# Patient Record
Sex: MALE | Race: BLACK OR AFRICAN AMERICAN | NOT HISPANIC OR LATINO | URBAN - METROPOLITAN AREA
[De-identification: names, ages, dates, MRNs, and addresses within clinical notes are randomized per-mention and may not be internally consistent; named-entity substitution may affect disease eponyms.]

---

## 2017-09-09 ENCOUNTER — INPATIENT (INPATIENT)
Facility: HOSPITAL | Age: 52
LOS: 1 days | Discharge: HOME | End: 2017-09-11
Attending: INTERNAL MEDICINE | Admitting: INTERNAL MEDICINE
Payer: COMMERCIAL

## 2017-09-09 VITALS
OXYGEN SATURATION: 100 % | DIASTOLIC BLOOD PRESSURE: 116 MMHG | HEART RATE: 64 BPM | RESPIRATION RATE: 18 BRPM | TEMPERATURE: 99 F | SYSTOLIC BLOOD PRESSURE: 172 MMHG

## 2017-09-09 DIAGNOSIS — R74.8 ABNORMAL LEVELS OF OTHER SERUM ENZYMES: ICD-10-CM

## 2017-09-09 DIAGNOSIS — I16.0 HYPERTENSIVE URGENCY: ICD-10-CM

## 2017-09-09 DIAGNOSIS — Z29.9 ENCOUNTER FOR PROPHYLACTIC MEASURES, UNSPECIFIED: ICD-10-CM

## 2017-09-09 DIAGNOSIS — N18.3 CHRONIC KIDNEY DISEASE, STAGE 3 (MODERATE): ICD-10-CM

## 2017-09-09 DIAGNOSIS — Z98.52 VASECTOMY STATUS: Chronic | ICD-10-CM

## 2017-09-09 LAB
ALBUMIN SERPL ELPH-MCNC: 3.8 G/DL — SIGNIFICANT CHANGE UP (ref 3.3–5)
ALP SERPL-CCNC: 72 U/L — SIGNIFICANT CHANGE UP (ref 40–120)
ALT FLD-CCNC: 28 U/L — SIGNIFICANT CHANGE UP (ref 12–78)
ANION GAP SERPL CALC-SCNC: 11 MMOL/L — SIGNIFICANT CHANGE UP (ref 5–17)
ANION GAP SERPL CALC-SCNC: 6 MMOL/L — SIGNIFICANT CHANGE UP (ref 5–17)
APPEARANCE UR: CLEAR — SIGNIFICANT CHANGE UP
APTT BLD: 26.7 SEC — LOW (ref 27.5–37.4)
AST SERPL-CCNC: 19 U/L — SIGNIFICANT CHANGE UP (ref 15–37)
BACTERIA # UR AUTO: ABNORMAL
BASOPHILS # BLD AUTO: 0.1 K/UL — SIGNIFICANT CHANGE UP (ref 0–0.2)
BASOPHILS NFR BLD AUTO: 0.8 % — SIGNIFICANT CHANGE UP (ref 0–2)
BILIRUB SERPL-MCNC: 0.7 MG/DL — SIGNIFICANT CHANGE UP (ref 0.2–1.2)
BILIRUB UR-MCNC: NEGATIVE — SIGNIFICANT CHANGE UP
BUN SERPL-MCNC: 32 MG/DL — HIGH (ref 7–23)
BUN SERPL-MCNC: 34 MG/DL — HIGH (ref 7–23)
CALCIUM SERPL-MCNC: 8.7 MG/DL — SIGNIFICANT CHANGE UP (ref 8.5–10.1)
CALCIUM SERPL-MCNC: 8.9 MG/DL — SIGNIFICANT CHANGE UP (ref 8.5–10.1)
CHLORIDE SERPL-SCNC: 106 MMOL/L — SIGNIFICANT CHANGE UP (ref 96–108)
CHLORIDE SERPL-SCNC: 110 MMOL/L — HIGH (ref 96–108)
CK MB BLD-MCNC: 0.8 % — SIGNIFICANT CHANGE UP (ref 0–3.5)
CK MB BLD-MCNC: 1 % — SIGNIFICANT CHANGE UP (ref 0–3.5)
CK MB CFR SERPL CALC: 2.2 NG/ML — SIGNIFICANT CHANGE UP (ref 0.5–3.6)
CK MB CFR SERPL CALC: 2.3 NG/ML — SIGNIFICANT CHANGE UP (ref 0.5–3.6)
CK SERPL-CCNC: 227 U/L — SIGNIFICANT CHANGE UP (ref 26–308)
CK SERPL-CCNC: 294 U/L — SIGNIFICANT CHANGE UP (ref 26–308)
CO2 SERPL-SCNC: 26 MMOL/L — SIGNIFICANT CHANGE UP (ref 22–31)
CO2 SERPL-SCNC: 28 MMOL/L — SIGNIFICANT CHANGE UP (ref 22–31)
COLOR SPEC: YELLOW — SIGNIFICANT CHANGE UP
CREAT SERPL-MCNC: 1.92 MG/DL — HIGH (ref 0.5–1.3)
CREAT SERPL-MCNC: 2.21 MG/DL — HIGH (ref 0.5–1.3)
DIFF PNL FLD: NEGATIVE — SIGNIFICANT CHANGE UP
EOSINOPHIL # BLD AUTO: 0.1 K/UL — SIGNIFICANT CHANGE UP (ref 0–0.5)
EOSINOPHIL NFR BLD AUTO: 1.4 % — SIGNIFICANT CHANGE UP (ref 0–6)
EPI CELLS # UR: SIGNIFICANT CHANGE UP
GLUCOSE SERPL-MCNC: 148 MG/DL — HIGH (ref 70–99)
GLUCOSE SERPL-MCNC: 87 MG/DL — SIGNIFICANT CHANGE UP (ref 70–99)
GLUCOSE UR QL: NEGATIVE MG/DL — SIGNIFICANT CHANGE UP
HCT VFR BLD CALC: 43.4 % — SIGNIFICANT CHANGE UP (ref 39–50)
HGB BLD-MCNC: 13.5 G/DL — SIGNIFICANT CHANGE UP (ref 13–17)
HYALINE CASTS # UR AUTO: ABNORMAL /LPF
INR BLD: 1.04 RATIO — SIGNIFICANT CHANGE UP (ref 0.88–1.16)
KETONES UR-MCNC: NEGATIVE — SIGNIFICANT CHANGE UP
LEUKOCYTE ESTERASE UR-ACNC: NEGATIVE — SIGNIFICANT CHANGE UP
LYMPHOCYTES # BLD AUTO: 2.1 K/UL — SIGNIFICANT CHANGE UP (ref 1–3.3)
LYMPHOCYTES # BLD AUTO: 21.2 % — SIGNIFICANT CHANGE UP (ref 13–44)
MCHC RBC-ENTMCNC: 24.3 PG — LOW (ref 27–34)
MCHC RBC-ENTMCNC: 31.1 GM/DL — LOW (ref 32–36)
MCV RBC AUTO: 78.1 FL — LOW (ref 80–100)
MONOCYTES # BLD AUTO: 0.5 K/UL — SIGNIFICANT CHANGE UP (ref 0–0.9)
MONOCYTES NFR BLD AUTO: 5 % — SIGNIFICANT CHANGE UP (ref 2–14)
NEUTROPHILS # BLD AUTO: 7.1 K/UL — SIGNIFICANT CHANGE UP (ref 1.8–7.4)
NEUTROPHILS NFR BLD AUTO: 71.6 % — SIGNIFICANT CHANGE UP (ref 43–77)
NITRITE UR-MCNC: NEGATIVE — SIGNIFICANT CHANGE UP
NT-PROBNP SERPL-SCNC: 1135 PG/ML — HIGH (ref 0–125)
PH UR: 5 — SIGNIFICANT CHANGE UP (ref 5–8)
PLATELET # BLD AUTO: 261 K/UL — SIGNIFICANT CHANGE UP (ref 150–400)
POTASSIUM SERPL-MCNC: 2.7 MMOL/L — CRITICAL LOW (ref 3.5–5.3)
POTASSIUM SERPL-MCNC: 3.3 MMOL/L — LOW (ref 3.5–5.3)
POTASSIUM SERPL-SCNC: 2.7 MMOL/L — CRITICAL LOW (ref 3.5–5.3)
POTASSIUM SERPL-SCNC: 3.3 MMOL/L — LOW (ref 3.5–5.3)
PROT SERPL-MCNC: 8.1 GM/DL — SIGNIFICANT CHANGE UP (ref 6–8.3)
PROT UR-MCNC: 100 MG/DL
PROTHROM AB SERPL-ACNC: 11.3 SEC — SIGNIFICANT CHANGE UP (ref 9.8–12.7)
RBC # BLD: 5.55 M/UL — SIGNIFICANT CHANGE UP (ref 4.2–5.8)
RBC # FLD: 13 % — SIGNIFICANT CHANGE UP (ref 11–15)
RBC CASTS # UR COMP ASSIST: SIGNIFICANT CHANGE UP /HPF (ref 0–4)
SODIUM SERPL-SCNC: 143 MMOL/L — SIGNIFICANT CHANGE UP (ref 135–145)
SODIUM SERPL-SCNC: 144 MMOL/L — SIGNIFICANT CHANGE UP (ref 135–145)
SP GR SPEC: 1.01 — SIGNIFICANT CHANGE UP (ref 1.01–1.02)
TROPONIN I SERPL-MCNC: 0.12 NG/ML — HIGH (ref 0.01–0.04)
TROPONIN I SERPL-MCNC: 0.33 NG/ML — HIGH (ref 0.01–0.04)
TSH SERPL-MCNC: 0.79 UIU/ML — SIGNIFICANT CHANGE UP (ref 0.36–3.74)
UROBILINOGEN FLD QL: NEGATIVE MG/DL — SIGNIFICANT CHANGE UP
WBC # BLD: 9.9 K/UL — SIGNIFICANT CHANGE UP (ref 3.8–10.5)
WBC # FLD AUTO: 9.9 K/UL — SIGNIFICANT CHANGE UP (ref 3.8–10.5)
WBC UR QL: SIGNIFICANT CHANGE UP

## 2017-09-09 PROCEDURE — 99223 1ST HOSP IP/OBS HIGH 75: CPT

## 2017-09-09 PROCEDURE — 99284 EMERGENCY DEPT VISIT MOD MDM: CPT | Mod: 25

## 2017-09-09 PROCEDURE — 93010 ELECTROCARDIOGRAM REPORT: CPT

## 2017-09-09 PROCEDURE — 93306 TTE W/DOPPLER COMPLETE: CPT | Mod: 26

## 2017-09-09 PROCEDURE — 99223 1ST HOSP IP/OBS HIGH 75: CPT | Mod: AI

## 2017-09-09 RX ORDER — AMLODIPINE BESYLATE 2.5 MG/1
10 TABLET ORAL DAILY
Qty: 0 | Refills: 0 | Status: DISCONTINUED | OUTPATIENT
Start: 2017-09-09 | End: 2017-09-11

## 2017-09-09 RX ORDER — LABETALOL HCL 100 MG
10 TABLET ORAL ONCE
Qty: 0 | Refills: 0 | Status: COMPLETED | OUTPATIENT
Start: 2017-09-09 | End: 2017-09-09

## 2017-09-09 RX ORDER — TAMSULOSIN HYDROCHLORIDE 0.4 MG/1
0.4 CAPSULE ORAL AT BEDTIME
Qty: 0 | Refills: 0 | Status: DISCONTINUED | OUTPATIENT
Start: 2017-09-09 | End: 2017-09-10

## 2017-09-09 RX ORDER — ASPIRIN/CALCIUM CARB/MAGNESIUM 324 MG
81 TABLET ORAL DAILY
Qty: 0 | Refills: 0 | Status: DISCONTINUED | OUTPATIENT
Start: 2017-09-09 | End: 2017-09-11

## 2017-09-09 RX ORDER — LABETALOL HCL 100 MG
5 TABLET ORAL ONCE
Qty: 0 | Refills: 0 | Status: COMPLETED | OUTPATIENT
Start: 2017-09-09 | End: 2017-09-09

## 2017-09-09 RX ORDER — ASPIRIN/CALCIUM CARB/MAGNESIUM 324 MG
325 TABLET ORAL ONCE
Qty: 0 | Refills: 0 | Status: COMPLETED | OUTPATIENT
Start: 2017-09-09 | End: 2017-09-09

## 2017-09-09 RX ORDER — HYDRALAZINE HCL 50 MG
10 TABLET ORAL ONCE
Qty: 0 | Refills: 0 | Status: DISCONTINUED | OUTPATIENT
Start: 2017-09-09 | End: 2017-09-09

## 2017-09-09 RX ORDER — HYDRALAZINE HCL 50 MG
10 TABLET ORAL ONCE
Qty: 0 | Refills: 0 | Status: COMPLETED | OUTPATIENT
Start: 2017-09-09 | End: 2017-09-09

## 2017-09-09 RX ORDER — POTASSIUM CHLORIDE 20 MEQ
40 PACKET (EA) ORAL ONCE
Qty: 0 | Refills: 0 | Status: COMPLETED | OUTPATIENT
Start: 2017-09-09 | End: 2017-09-09

## 2017-09-09 RX ORDER — LOSARTAN POTASSIUM 100 MG/1
100 TABLET, FILM COATED ORAL DAILY
Qty: 0 | Refills: 0 | Status: DISCONTINUED | OUTPATIENT
Start: 2017-09-09 | End: 2017-09-11

## 2017-09-09 RX ORDER — HEPARIN SODIUM 5000 [USP'U]/ML
5000 INJECTION INTRAVENOUS; SUBCUTANEOUS EVERY 12 HOURS
Qty: 0 | Refills: 0 | Status: DISCONTINUED | OUTPATIENT
Start: 2017-09-09 | End: 2017-09-11

## 2017-09-09 RX ADMIN — Medication 325 MILLIGRAM(S): at 06:01

## 2017-09-09 RX ADMIN — LOSARTAN POTASSIUM 100 MILLIGRAM(S): 100 TABLET, FILM COATED ORAL at 08:10

## 2017-09-09 RX ADMIN — Medication 10 MILLIGRAM(S): at 21:38

## 2017-09-09 RX ADMIN — Medication 5 MILLIGRAM(S): at 05:40

## 2017-09-09 RX ADMIN — AMLODIPINE BESYLATE 10 MILLIGRAM(S): 2.5 TABLET ORAL at 08:10

## 2017-09-09 RX ADMIN — Medication 10 MILLIGRAM(S): at 18:47

## 2017-09-09 RX ADMIN — TAMSULOSIN HYDROCHLORIDE 0.4 MILLIGRAM(S): 0.4 CAPSULE ORAL at 21:39

## 2017-09-09 RX ADMIN — Medication 40 MILLIEQUIVALENT(S): at 05:26

## 2017-09-09 RX ADMIN — Medication 10 MILLIGRAM(S): at 06:55

## 2017-09-09 RX ADMIN — Medication 10 MILLIGRAM(S): at 08:00

## 2017-09-09 RX ADMIN — HEPARIN SODIUM 5000 UNIT(S): 5000 INJECTION INTRAVENOUS; SUBCUTANEOUS at 18:50

## 2017-09-09 RX ADMIN — Medication 81 MILLIGRAM(S): at 13:08

## 2017-09-09 NOTE — H&P ADULT - NSHPLABSRESULTS_GEN_ALL_CORE
LABS:                        13.5   9.9   )-----------( 261      ( 09 Sep 2017 03:05 )             43.4         143  |  106  |  34<H>  ----------------------------<  148<H>  2.7<LL>   |  26  |  2.21<H>    Ca    8.9      09 Sep 2017 03:05    TPro  8.1  /  Alb  3.8  /  TBili  0.7  /  DBili  x   /  AST  19  /  ALT  28  /  AlkPhos  72      PT/INR - ( 09 Sep 2017 03:05 )   PT: 11.3 sec;   INR: 1.04 ratio         PTT - ( 09 Sep 2017 03:05 )  PTT:26.7 sec  Urinalysis Basic - ( 09 Sep 2017 05:19 )    Color: Yellow / Appearance: Clear / S.015 / pH: x  Gluc: x / Ketone: Negative  / Bili: Negative / Urobili: Negative mg/dL   Blood: x / Protein: 100 mg/dL / Nitrite: Negative   Leuk Esterase: Negative / RBC: 0-2 /HPF / WBC 0-2   Sq Epi: x / Non Sq Epi: x / Bacteria: Few      CAPILLARY BLOOD GLUCOSE          RADIOLOGY & ADDITIONAL TESTS:    Imaging Personally Reviewed:  [ X] YES  [ ] NO

## 2017-09-09 NOTE — H&P ADULT - PROBLEM SELECTOR PLAN 1
admit to tele  pt doesn't prefer hydralazine as thinks affects sexually  increase losartan to 100  resume norvasc
hair removal not indicated

## 2017-09-09 NOTE — H&P ADULT - ASSESSMENT
50M with pmh CKD, HTN poor compliance , who is admitted with hypertensive urgency and elevated troponin

## 2017-09-09 NOTE — CONSULT NOTE ADULT - SUBJECTIVE AND OBJECTIVE BOX
Patient is a 52y old  Male who presents with a chief complaint of elevated BP  HPI:Patient ran out of medication 1 month ago, and stopped a second medication.        PAST MEDICAL & SURGICAL HISTORY:  Renal failure  Pneumonia  Hypertension  H/O vasectomy    FAMILY HISTORY:  Family history of congenital heart disease (Father, Mother)    No Known Allergies    MEDICATIONS  (STANDING):    MEDICATIONS  (PRN):    Vital Signs Last 24 Hrs  T(C): 37.3 (09 Sep 2017 12:06), Max: 37.3 (09 Sep 2017 12:06)  T(F): 99.2 (09 Sep 2017 12:06), Max: 99.2 (09 Sep 2017 12:06)  HR: 65 (09 Sep 2017 12:06) (65 - 65)  BP: 171/109 (09 Sep 2017 12:06) (171/109 - 171/109)  BP(mean): --  RR: 16 (09 Sep 2017 12:06) (16 - 16)  SpO2: 100% (09 Sep 2017 12:06) (100% - 100%)    CAPILLARY BLOOD GLUCOSE        PHYSICAL EXAM:      T(C): 37.3 (09 Sep 2017 12:06), Max: 37.3 (09 Sep 2017 12:06)  HR: 65 (09 Sep 2017 12:06) (65 - 65)  BP: 171/109 (09 Sep 2017 12:06) (171/109 - 171/109)  RR: 16 (09 Sep 2017 12:06) (16 - 16)  SpO2: 100% (09 Sep 2017 12:06) (100% - 100%)  Wt(kg): --  Respiratory: clear anteriorly, decreased BS at bases  Cardiovascular: S1 S2  Gastrointestinal: soft NT ND +BS  Extremities:   1 edema                  143  |  106  |  34<H>  ----------------------------<  148<H>  2.7<LL>   |  26  |  2.21<H>    Ca    8.9      09 Sep 2017 03:05    TPro  8.1  /  Alb  3.8  /  TBili  0.7  /  DBili  x   /  AST  19  /  ALT  28  /  AlkPhos  72                            13.5   9.9   )-----------( 261      ( 09 Sep 2017 03:05 )             43.4     Urinalysis Basic - ( 09 Sep 2017 05:19 )    Color: Yellow / Appearance: Clear / S.015 / pH: x  Gluc: x / Ketone: Negative  / Bili: Negative / Urobili: Negative mg/dL   Blood: x / Protein: 100 mg/dL / Nitrite: Negative   Leuk Esterase: Negative / RBC: 0-2 /HPF / WBC 0-2   Sq Epi: x / Non Sq Epi: x / Bacteria: Few            Assessment and Plan    CKD 3; HTN urgency; non adherence.  Medication reinstitution;  Will follow; avoid Hydralazine for now patient preference.

## 2017-09-09 NOTE — H&P ADULT - FAMILY HISTORY
Father  Still living? Unknown  Family history of congenital heart disease, Age at diagnosis: Age Unknown     Mother  Still living? Unknown  Family history of congenital heart disease, Age at diagnosis: Age Unknown

## 2017-09-09 NOTE — H&P ADULT - HISTORY OF PRESENT ILLNESS
Patient is a 51 y/o male with pmhx of  HTN, CKD  who presents withhe was in [facility] was checking his room mates BP and then decided to check his and it was 200/110 and comes in.  pt denies any HA, fever, chills, sob, palpitations, cp, n/v/d/c.  Pt denies being compliant w/meds, ran out of norvasc a month ago (didnt ask pmd for refill) hydralazine states affects sexually so doesnt take all the time, was just taking losartan.  In ed pt also found to have elevated troponin, low K.  pt w/good exercise tolerance.  on reviewing prior records pt had hep B sAg + in 2016, pt w/o fu on that

## 2017-09-09 NOTE — H&P ADULT - NSHPPHYSICALEXAM_GEN_ALL_CORE
Vital Signs Last 24 Hrs  T(C): 37.3 (09 Sep 2017 12:06), Max: 37.3 (09 Sep 2017 12:06)  T(F): 99.2 (09 Sep 2017 12:06), Max: 99.2 (09 Sep 2017 12:06)  HR: 65 (09 Sep 2017 12:06) (64 - 65)  BP: 171/109 (09 Sep 2017 12:06) (171/109 - 172/116)  BP(mean): --  RR: 16 (09 Sep 2017 12:06) (16 - 18)  SpO2: 100% (09 Sep 2017 12:06) (100% - 100%)    PHYSICAL EXAM:    GENERAL: NAD, well-groomed, well-developed  HEAD:  Atraumatic, Normocephalic  EYES: EOMI, PERRLA, conjunctiva and sclera clear  ENMT: No tonsillar erythema, exudates, or enlargement; Moist mucous membranes, No lesions  NECK: Supple, No JVD, Normal thyroid  NERVOUS SYSTEM:  Alert & Oriented X3, Good concentration; Motor Strength 5/5 B/L upper and lower extremities; DTRs 2+ intact and symmetric  CHEST/LUNG: Clear to percussion bilaterally; No rales, rhonchi, wheezing, or rubs  HEART: Regular rate and rhythm; No rubs, or gallops, +S1,S2  ABDOMEN: Soft, Nontender, Nondistended; Bowel sounds present  EXTREMITIES:  2+ Peripheral Pulses, No clubbing, cyanosis, 1+edema  LYMPH: No cervical adenopathy  RECTAL: deferred  BREAST: No palpatble masses, skin no lesions   : deferred  SKIN: No rashes or lesions

## 2017-09-09 NOTE — CHART NOTE - NSCHARTNOTEFT_GEN_A_CORE
patient was seen and examine at ECHO  No chest pain, SOB   feeing well  Chest clear   No edema LE   A/p   Labs reviewed    HTN urgency  Continue Norvasc and ARB  labs in AM

## 2017-09-09 NOTE — DOWNTIME INTERRUPTION NOTE - WHICH MANUAL FORMS INITIATED?
home med list  paper MAR- STAT Medication   physician order   ER Nursing care record  ER I&O/VS Care record  Admission demographics  ER MAR  ER Admission Physician Assessment  Progress Notes, Critical value notification, Physician Order Sheet- Admission, Consultation

## 2017-09-10 LAB
ANION GAP SERPL CALC-SCNC: 9 MMOL/L — SIGNIFICANT CHANGE UP (ref 5–17)
BUN SERPL-MCNC: 31 MG/DL — HIGH (ref 7–23)
CALCIUM SERPL-MCNC: 8.8 MG/DL — SIGNIFICANT CHANGE UP (ref 8.5–10.1)
CHLORIDE SERPL-SCNC: 109 MMOL/L — HIGH (ref 96–108)
CHOLEST SERPL-MCNC: 157 MG/DL — SIGNIFICANT CHANGE UP (ref 10–199)
CO2 SERPL-SCNC: 26 MMOL/L — SIGNIFICANT CHANGE UP (ref 22–31)
CREAT SERPL-MCNC: 2.02 MG/DL — HIGH (ref 0.5–1.3)
GLUCOSE SERPL-MCNC: 91 MG/DL — SIGNIFICANT CHANGE UP (ref 70–99)
HAV IGM SER-ACNC: SIGNIFICANT CHANGE UP
HBV CORE IGM SER-ACNC: SIGNIFICANT CHANGE UP
HBV SURFACE AB SER-ACNC: SIGNIFICANT CHANGE UP
HBV SURFACE AG SER-ACNC: REACTIVE
HCT VFR BLD CALC: 42.9 % — SIGNIFICANT CHANGE UP (ref 39–50)
HCV AB S/CO SERPL IA: 0.14 S/CO — SIGNIFICANT CHANGE UP
HCV AB SERPL-IMP: SIGNIFICANT CHANGE UP
HDLC SERPL-MCNC: 36 MG/DL — LOW (ref 40–125)
HGB BLD-MCNC: 13.1 G/DL — SIGNIFICANT CHANGE UP (ref 13–17)
LIPID PNL WITH DIRECT LDL SERPL: 93 MG/DL — SIGNIFICANT CHANGE UP
MCHC RBC-ENTMCNC: 24.3 PG — LOW (ref 27–34)
MCHC RBC-ENTMCNC: 30.5 GM/DL — LOW (ref 32–36)
MCV RBC AUTO: 79.8 FL — LOW (ref 80–100)
PLATELET # BLD AUTO: 250 K/UL — SIGNIFICANT CHANGE UP (ref 150–400)
POTASSIUM SERPL-MCNC: 3.4 MMOL/L — LOW (ref 3.5–5.3)
POTASSIUM SERPL-SCNC: 3.4 MMOL/L — LOW (ref 3.5–5.3)
RBC # BLD: 5.38 M/UL — SIGNIFICANT CHANGE UP (ref 4.2–5.8)
RBC # FLD: 12.4 % — SIGNIFICANT CHANGE UP (ref 11–15)
SODIUM SERPL-SCNC: 144 MMOL/L — SIGNIFICANT CHANGE UP (ref 135–145)
TOTAL CHOLESTEROL/HDL RATIO MEASUREMENT: 4.4 RATIO — SIGNIFICANT CHANGE UP (ref 3.4–9.6)
TRIGL SERPL-MCNC: 138 MG/DL — SIGNIFICANT CHANGE UP (ref 10–149)
WBC # BLD: 5.9 K/UL — SIGNIFICANT CHANGE UP (ref 3.8–10.5)
WBC # FLD AUTO: 5.9 K/UL — SIGNIFICANT CHANGE UP (ref 3.8–10.5)

## 2017-09-10 PROCEDURE — 99232 SBSQ HOSP IP/OBS MODERATE 35: CPT

## 2017-09-10 RX ORDER — DOXAZOSIN MESYLATE 4 MG
4 TABLET ORAL AT BEDTIME
Qty: 0 | Refills: 0 | Status: DISCONTINUED | OUTPATIENT
Start: 2017-09-10 | End: 2017-09-11

## 2017-09-10 RX ORDER — POTASSIUM CHLORIDE 20 MEQ
40 PACKET (EA) ORAL ONCE
Qty: 0 | Refills: 0 | Status: COMPLETED | OUTPATIENT
Start: 2017-09-10 | End: 2017-09-10

## 2017-09-10 RX ADMIN — Medication 0.2 MILLIGRAM(S): at 14:02

## 2017-09-10 RX ADMIN — Medication 4 MILLIGRAM(S): at 21:33

## 2017-09-10 RX ADMIN — HEPARIN SODIUM 5000 UNIT(S): 5000 INJECTION INTRAVENOUS; SUBCUTANEOUS at 18:26

## 2017-09-10 RX ADMIN — AMLODIPINE BESYLATE 10 MILLIGRAM(S): 2.5 TABLET ORAL at 05:38

## 2017-09-10 RX ADMIN — LOSARTAN POTASSIUM 100 MILLIGRAM(S): 100 TABLET, FILM COATED ORAL at 05:38

## 2017-09-10 RX ADMIN — Medication 81 MILLIGRAM(S): at 13:00

## 2017-09-10 RX ADMIN — Medication 40 MILLIEQUIVALENT(S): at 13:00

## 2017-09-10 RX ADMIN — HEPARIN SODIUM 5000 UNIT(S): 5000 INJECTION INTRAVENOUS; SUBCUTANEOUS at 05:38

## 2017-09-10 RX ADMIN — Medication 0.2 MILLIGRAM(S): at 21:33

## 2017-09-10 NOTE — PROGRESS NOTE ADULT - SUBJECTIVE AND OBJECTIVE BOX
Patient is a 52y Male with a known history of :  Preventive measure (Z29.9)  Stage 3 chronic kidney disease (N18.3)  Elevated troponin (R74.8)  Hypertensive urgency (I16.0)    HPI:  Patient is a 51 y/o male with pmhx of  HTN, CKD  who presents withhe was in [facility] was checking his room mates BP and then decided to check his and it was 200/110 and comes in.  pt denies any HA, fever, chills, sob, palpitations, cp, n/v/d/c.  Pt denies being noncompliant w/meds, ran out of norvasc a month ago (didnt ask pmd for refill) hydralazine states affects sexually so doesnt take all the time, was just taking losartan.  In ed pt also found to have elevated troponin, low K.  pt w/good exercise tolerance.  Not responding to current meds - Clonidine added by PCP today.      REVIEW OF SYSTEMS:    CONSTITUTIONAL: No fever, weight loss, or fatigue  EYES: No eye pain, visual disturbances, or discharge  ENMT:  No difficulty hearing, tinnitus, vertigo; No sinus or throat pain  NECK: No pain or stiffness  BREASTS: No pain, masses, or nipple discharge  RESPIRATORY: No cough, wheezing, chills or hemoptysis; No shortness of breath  CARDIOVASCULAR: No chest pain, palpitations, dizziness, or leg swelling  GASTROINTESTINAL: No abdominal or epigastric pain. No nausea, vomiting, or hematemesis; No diarrhea or constipation. No melena or hematochezia.  GENITOURINARY: No dysuria, frequency, hematuria, or incontinence  NEUROLOGICAL: No headaches, memory loss, loss of strength, numbness, or tremors  SKIN: No itching, burning, rashes, or lesions   LYMPH NODES: No enlarged glands  ENDOCRINE: No heat or cold intolerance; No hair loss  MUSCULOSKELETAL: No joint pain or swelling; No muscle, back, or extremity pain  PSYCHIATRIC: No depression, anxiety, mood swings, or difficulty sleeping  HEME/LYMPH: No easy bruising, or bleeding gums  ALLERGY AND IMMUNOLOGIC: No hives or eczema    MEDICATIONS  (STANDING):  heparin  Injectable 5000 Unit(s) SubCutaneous every 12 hours  amLODIPine   Tablet 10 milliGRAM(s) Oral daily  losartan 100 milliGRAM(s) Oral daily  aspirin enteric coated 81 milliGRAM(s) Oral daily  cloNIDine 0.2 milliGRAM(s) Oral every 8 hours  doxazosin 4 milliGRAM(s) Oral at bedtime    MEDICATIONS  (PRN):      ALLERGIES: No Known Allergies      FAMILY HISTORY:  Family history of congenital heart disease (Father, Mother)      PHYSICAL EXAMINATION:  -----------------------------  T(C): 36.4 (09-10-17 @ 10:30), Max: 36.4 (09-10-17 @ 10:30)  HR: 73 (09-10-17 @ 10:30) (62 - 73)  BP: 182/117 (09-10-17 @ 10:30) (169/107 - 191/106)  RR: 18 (09-10-17 @ 10:30) (17 - 22)  SpO2: 96% (09-10-17 @ 10:30) (96% - 99%)  Wt(kg): --        Constitutional: well developed, normal appearance, well groomed, well nourished, no deformities and no acute distress.   Eyes: the conjunctiva exhibited no abnormalities and the eyelids demonstrated no xanthelasmas.   HEENT: normal oral mucosa, no oral pallor and no oral cyanosis.   Neck: normal jugular venous A waves present, normal jugular venous V waves present and no jugular venous cote A waves.   Pulmonary: no respiratory distress, normal respiratory rhythm and effort, no accessory muscle use and lungs were clear to auscultation bilaterally.   Cardiovascular: heart rate and rhythm were normal, normal S1 and S2 and no murmur, gallop, rub, heave or thrill are present.   Abdomen: soft, non-tender, no hepato-splenomegaly and no abdominal mass palpated.   Musculoskeletal: the gait could not be assessed..   Extremities: no clubbing of the fingernails, no localized cyanosis, no petechial hemorrhages and no ischemic changes.   Skin: normal skin color and pigmentation, no rash, no venous stasis, no skin lesions, no skin ulcer and no xanthoma was observed.   Psychiatric: oriented to person, place, and time, the affect was normal, the mood was normal and not feeling anxious.     LABS:   --------  09-10    144  |  109<H>  |  31<H>  ----------------------------<  91  3.4<L>   |  26  |  2.02<H>    Ca    8.8      10 Sep 2017 08:40    TPro  8.1  /  Alb  3.8  /  TBili  0.7  /  DBili  x   /  AST  19  /  ALT  28  /  AlkPhos  72  09-09                         13.1   5.9   )-----------( 250      ( 10 Sep 2017 08:40 )             42.9     PT/INR - ( 09 Sep 2017 03:05 )   PT: 11.3 sec;   INR: 1.04 ratio         PTT - ( 09 Sep 2017 03:05 )  PTT:26.7 sec    09-09 @ 14:34 CPK total:--, CKMB --, Troponin I - .333 ng/mL<H>  09-09 @ 03:05 CPK total:--, CKMB --, Troponin I - .125 ng/mL<H>    157 mg/dL, 93 mg/dL, 36 mg/dL<L>, 138 mg/dL

## 2017-09-10 NOTE — PROGRESS NOTE ADULT - SUBJECTIVE AND OBJECTIVE BOX
CHIEF COMPLAINT/INTERVAL HISTORY:    Patient is a 52y old  Male who presents with a chief complaint of high BP (09 Sep 2017 14:26)      HPI:  Patient is a 51 y/o male with pmhx of  HTN, CKD  who presents withhe was in [facility] was checking his room mates BP and then decided to check his and it was 200/110 and comes in.  pt denies any HA, fever, chills, sob, palpitations, cp, n/v/d/c.  Pt denies being compliant w/meds, ran out of norvasc a month ago (didnt ask pmd for refill) hydralazine states affects sexually so doesnt take all the time, was just taking losartan.  In ed pt also found to have elevated troponin, low K.  pt w/good exercise tolerance.  on reviewing prior records pt had hep B sAg + in , pt w/o fu on that (09 Sep 2017 14:26)    Overnight issues  Bp remains high  No chest pain, SOB , headache          SUBJECTIVE & OBJECTIVE: Pt seen and examined at bedside.   ROS:  CONSTITUTIONAL: No fever, weight loss, or fatigue  EYES: No eye pain, visual disturbances, or discharge  ENMT:  No difficulty hearing, tinnitus, vertigo; No sinus or throat pain  NECK: No pain or stiffness  RESPIRATORY: No cough, wheezing, chills or hemoptysis; No shortness of breath  CARDIOVASCULAR: No chest pain, palpitations, dizziness, or leg swelling  GASTROINTESTINAL: No abdominal or epigastric pain. No nausea, vomiting, or hematemesis; No diarrhea or constipation. No melena or hematochezia.  GENITOURINARY: No dysuria, frequency, hematuria, or incontinence  NEUROLOGICAL: No headaches, memory loss, loss of strength, numbness, or tremors  SKIN: No itching, burning, rashes, or lesions   LYMPH NODES: No enlarged glands  ENDOCRINE: No heat or cold intolerance; No hair loss  MUSCULOSKELETAL: No joint pain or swelling; No muscle, back, or extremity pain  PSYCHIATRIC: No depression, anxiety, mood swings, or difficulty sleeping  HEME/LYMPH: No easy bruising, or bleeding gums  ALLERGY AND IMMUNOLOGIC: No hives or eczema  ICU Vital Signs Last 24 Hrs  T(C): 36.4 (10 Sep 2017 10:30), Max: 37.3 (09 Sep 2017 12:06)  T(F): 97.6 (10 Sep 2017 10:30), Max: 99.2 (09 Sep 2017 12:06)  HR: 73 (10 Sep 2017 10:30) (62 - 73)  BP: 182/117 (10 Sep 2017 10:30) (169/107 - 191/106)  BP(mean): --  ABP: --  ABP(mean): --  RR: 18 (10 Sep 2017 10:30) (16 - 22)  SpO2: 96% (10 Sep 2017 10:30) (96% - 100%)        MEDICATIONS  (STANDING):  heparin  Injectable 5000 Unit(s) SubCutaneous every 12 hours  amLODIPine   Tablet 10 milliGRAM(s) Oral daily  losartan 100 milliGRAM(s) Oral daily  aspirin enteric coated 81 milliGRAM(s) Oral daily  tamsulosin 0.4 milliGRAM(s) Oral at bedtime  potassium chloride    Tablet ER 40 milliEquivalent(s) Oral once    MEDICATIONS  (PRN):        PHYSICAL EXAM:    GENERAL: NAD, well-groomed, well-developed  HEAD:  Atraumatic, Normocephalic  EYES: EOMI, PERRLA, conjunctiva and sclera clear  ENMT: Moist mucous membranes  NECK: Supple, No JVD  NERVOUS SYSTEM:  Alert & Oriented X3, Motor Strength 5/5 B/L upper and lower extremities; DTRs 2+ intact and symmetric  CHEST/LUNG: Clear to auscultation bilaterally; No rales, rhonchi, wheezing, or rubs  HEART: Regular rate and rhythm; No murmurs, rubs, or gallops  ABDOMEN: Soft, Nontender, Nondistended; Bowel sounds present  EXTREMITIES:  2+ Peripheral Pulses, No clubbing, cyanosis, or edema    LABS:                        13.1   5.9   )-----------( 250      ( 10 Sep 2017 08:40 )             42.9     09-10    144  |  109<H>  |  31<H>  ----------------------------<  91  3.4<L>   |  26  |  2.02<H>    Ca    8.8      10 Sep 2017 08:40    TPro  8.1  /  Alb  3.8  /  TBili  0.7  /  DBili  x   /  AST  19  /  ALT  28  /  AlkPhos  72  09-09    PT/INR - ( 09 Sep 2017 03:05 )   PT: 11.3 sec;   INR: 1.04 ratio         PTT - ( 09 Sep 2017 03:05 )  PTT:26.7 sec  Urinalysis Basic - ( 09 Sep 2017 05:19 )    Color: Yellow / Appearance: Clear / S.015 / pH: x  Gluc: x / Ketone: Negative  / Bili: Negative / Urobili: Negative mg/dL   Blood: x / Protein: 100 mg/dL / Nitrite: Negative   Leuk Esterase: Negative / RBC: 0-2 /HPF / WBC 0-2   Sq Epi: x / Non Sq Epi: x / Bacteria: Few        CAPILLARY BLOOD GLUCOSE          RECENT CULTURES:      RADIOLOGY & ADDITIONAL TESTS:  Imaging Personally Reviewed:  [ ] YES      Consultant(s) Notes Reviewed:  [ ] YES     Care Discussed with [ ] Consultants [X ] Patient [ ] Family  [ ]    [x ]  Other; RN  HEALTH ISSUES - PROBLEM Dx:  Preventive measure: Preventive measure  Stage 3 chronic kidney disease: Stage 3 chronic kidney disease  Elevated troponin: Elevated troponin  Hypertensive urgency: Hypertensive urgency        DVT/GI ppx  Discussed with pt @ bedside

## 2017-09-10 NOTE — PROGRESS NOTE ADULT - ASSESSMENT
52 AA male with known HHD and poorly controlled BP in the setting of non compliance.  Continue titrate meds. Agree with checking renin/aldosterone, also added plasma metanephrine.

## 2017-09-10 NOTE — PROGRESS NOTE ADULT - PROBLEM SELECTOR PLAN 4
sq heparin  Pt w/ +hep b surface Ag in 2016.  check hep serologies and ab  Consider ID or hepatitis evaluation

## 2017-09-10 NOTE — PROGRESS NOTE ADULT - SUBJECTIVE AND OBJECTIVE BOX
Patient seen in follow up for HTN urgency, CKD; feels well.    MEDICATIONS  (STANDING):  heparin  Injectable 5000 Unit(s) SubCutaneous every 12 hours  amLODIPine   Tablet 10 milliGRAM(s) Oral daily  losartan 100 milliGRAM(s) Oral daily  aspirin enteric coated 81 milliGRAM(s) Oral daily  cloNIDine 0.2 milliGRAM(s) Oral every 8 hours  doxazosin 4 milliGRAM(s) Oral at bedtime    MEDICATIONS  (PRN):    PHYSICAL EXAM:      T(C): 37.6 (09-10-17 @ 16:45), Max: 37.6 (09-10-17 @ 16:45)  HR: 70 (09-10-17 @ 16:45) (62 - 73)  BP: 161/112 (09-10-17 @ 16:45) (161/112 - 182/117)  RR: 22 (09-10-17 @ 16:45) (17 - 22)  SpO2: 97% (09-10-17 @ 16:45) (96% - 99%)  Wt(kg): --  Respiratory: clear anteriorly, decreased BS at bases  Cardiovascular: S1 S2  Gastrointestinal: soft NT ND +BS  Extremities: tr  edema                                    13.1   5.9   )-----------( 250      ( 10 Sep 2017 08:40 )             42.9     09-10    144  |  109<H>  |  31<H>  ----------------------------<  91  3.4<L>   |  26  |  2.02<H>    Ca    8.8      10 Sep 2017 08:40    TPro  8.1  /  Alb  3.8  /  TBili  0.7  /  DBili  x   /  AST  19  /  ALT  28  /  AlkPhos  72  09-09      LIVER FUNCTIONS - ( 09 Sep 2017 03:05 )  Alb: 3.8 g/dL / Pro: 8.1 gm/dL / ALK PHOS: 72 U/L / ALT: 28 U/L / AST: 19 U/L / GGT: x             Assessment and Plan:    CKD 3; HTN urgency;   BP medications adjusted;   Will follow, anticipate D/C Monday.

## 2017-09-10 NOTE — PROGRESS NOTE ADULT - PROBLEM SELECTOR PLAN 1
BP remains high  High BP- diificult to control- in setting of Low potassium and high serum bicarbonate- Metabolic alkalosis- r/o out primary hyper aldosteronism  Will check renin and aldosterone level  Replete K  resume norvasc  Continue to follow Cardiology and Nephro

## 2017-09-11 ENCOUNTER — TRANSCRIPTION ENCOUNTER (OUTPATIENT)
Age: 52
End: 2017-09-11

## 2017-09-11 VITALS
DIASTOLIC BLOOD PRESSURE: 95 MMHG | SYSTOLIC BLOOD PRESSURE: 145 MMHG | RESPIRATION RATE: 16 BRPM | HEART RATE: 71 BPM | TEMPERATURE: 97 F | OXYGEN SATURATION: 97 %

## 2017-09-11 DIAGNOSIS — I10 ESSENTIAL (PRIMARY) HYPERTENSION: ICD-10-CM

## 2017-09-11 DIAGNOSIS — I16.9 HYPERTENSIVE CRISIS, UNSPECIFIED: ICD-10-CM

## 2017-09-11 DIAGNOSIS — I25.9 CHRONIC ISCHEMIC HEART DISEASE, UNSPECIFIED: ICD-10-CM

## 2017-09-11 LAB
ALDOST SERPL-MCNC: 12.2 NG/DL — SIGNIFICANT CHANGE UP
ANION GAP SERPL CALC-SCNC: 9 MMOL/L — SIGNIFICANT CHANGE UP (ref 5–17)
BUN SERPL-MCNC: 33 MG/DL — HIGH (ref 7–23)
CALCIUM SERPL-MCNC: 8.6 MG/DL — SIGNIFICANT CHANGE UP (ref 8.5–10.1)
CHLORIDE SERPL-SCNC: 107 MMOL/L — SIGNIFICANT CHANGE UP (ref 96–108)
CO2 SERPL-SCNC: 26 MMOL/L — SIGNIFICANT CHANGE UP (ref 22–31)
CREAT SERPL-MCNC: 2.03 MG/DL — HIGH (ref 0.5–1.3)
GLUCOSE SERPL-MCNC: 86 MG/DL — SIGNIFICANT CHANGE UP (ref 70–99)
HCT VFR BLD CALC: 39.4 % — SIGNIFICANT CHANGE UP (ref 39–50)
HGB BLD-MCNC: 12.2 G/DL — LOW (ref 13–17)
MCHC RBC-ENTMCNC: 24 PG — LOW (ref 27–34)
MCHC RBC-ENTMCNC: 31 GM/DL — LOW (ref 32–36)
MCV RBC AUTO: 77.4 FL — LOW (ref 80–100)
PLATELET # BLD AUTO: 236 K/UL — SIGNIFICANT CHANGE UP (ref 150–400)
POTASSIUM SERPL-MCNC: 4.2 MMOL/L — SIGNIFICANT CHANGE UP (ref 3.5–5.3)
POTASSIUM SERPL-SCNC: 4.2 MMOL/L — SIGNIFICANT CHANGE UP (ref 3.5–5.3)
RBC # BLD: 5.1 M/UL — SIGNIFICANT CHANGE UP (ref 4.2–5.8)
RBC # FLD: 12.9 % — SIGNIFICANT CHANGE UP (ref 11–15)
SODIUM SERPL-SCNC: 142 MMOL/L — SIGNIFICANT CHANGE UP (ref 135–145)
WBC # BLD: 5.3 K/UL — SIGNIFICANT CHANGE UP (ref 3.8–10.5)
WBC # FLD AUTO: 5.3 K/UL — SIGNIFICANT CHANGE UP (ref 3.8–10.5)

## 2017-09-11 PROCEDURE — 99239 HOSP IP/OBS DSCHRG MGMT >30: CPT

## 2017-09-11 RX ORDER — ASPIRIN/CALCIUM CARB/MAGNESIUM 324 MG
1 TABLET ORAL
Qty: 30 | Refills: 0 | OUTPATIENT
Start: 2017-09-11 | End: 2017-10-11

## 2017-09-11 RX ORDER — DOXAZOSIN MESYLATE 4 MG
1 TABLET ORAL
Qty: 30 | Refills: 0 | OUTPATIENT
Start: 2017-09-11 | End: 2017-10-11

## 2017-09-11 RX ORDER — AMLODIPINE BESYLATE 2.5 MG/1
1 TABLET ORAL
Qty: 30 | Refills: 0 | OUTPATIENT
Start: 2017-09-11 | End: 2017-10-11

## 2017-09-11 RX ORDER — LOSARTAN POTASSIUM 100 MG/1
1 TABLET, FILM COATED ORAL
Qty: 30 | Refills: 0 | OUTPATIENT
Start: 2017-09-11 | End: 2017-10-11

## 2017-09-11 RX ORDER — LOSARTAN POTASSIUM 100 MG/1
1 TABLET, FILM COATED ORAL
Qty: 0 | Refills: 0 | COMMUNITY
Start: 2017-09-11

## 2017-09-11 RX ADMIN — HEPARIN SODIUM 5000 UNIT(S): 5000 INJECTION INTRAVENOUS; SUBCUTANEOUS at 05:49

## 2017-09-11 RX ADMIN — Medication 0.2 MILLIGRAM(S): at 05:49

## 2017-09-11 RX ADMIN — LOSARTAN POTASSIUM 100 MILLIGRAM(S): 100 TABLET, FILM COATED ORAL at 05:49

## 2017-09-11 RX ADMIN — Medication 81 MILLIGRAM(S): at 11:20

## 2017-09-11 RX ADMIN — AMLODIPINE BESYLATE 10 MILLIGRAM(S): 2.5 TABLET ORAL at 05:49

## 2017-09-11 RX ADMIN — Medication 0.2 MILLIGRAM(S): at 14:48

## 2017-09-11 RX ADMIN — HEPARIN SODIUM 5000 UNIT(S): 5000 INJECTION INTRAVENOUS; SUBCUTANEOUS at 18:16

## 2017-09-11 NOTE — DISCHARGE NOTE ADULT - HOSPITAL COURSE
52y Male, poorly controlled HTN, noncompliant w/meds, stage 3 CKD (b/l Cr 2); adm w/HTN crisis w//110, elev trops 0.125->0.333; s/p optimization on antihypetensives , undergoing cardiac eval for ischemia  patient evaluated by nephrology and optimized,   cardiology evaluated and suggests stress echo after discharge   ECHO    Summary:   1. Left ventricular ejection fraction, by visual estimation, is 70 to   75%.   2. Hypertensive cardiomyopathy.   3. Spectral Doppler shows impaired relaxation pattern of left   ventricular myocardial filling (Grade I diastolic dysfunction).   4. There is mild concentric left ventricular hypertrophy.   5. Mild mitral valve regurgitation.   6. Mild aortic regurgitation.

## 2017-09-11 NOTE — CHART NOTE - NSCHARTNOTEFT_GEN_A_CORE
Notified by cardiology that patient is cleared for discharge,   patient is requesting to discharge   discussed with Dr Jonny ACEVEDO to D/C patient  discharge paperwork completed

## 2017-09-11 NOTE — DISCHARGE NOTE ADULT - PATIENT PORTAL LINK FT
“You can access the Follow[facility] Patient Portal, offered by [facility], by registering with the following website: [email or web address]”

## 2017-09-11 NOTE — DISCHARGE NOTE ADULT - CARE PROVIDERS DIRECT ADDRESSES
[email or web address]

## 2017-09-11 NOTE — PROGRESS NOTE ADULT - SUBJECTIVE AND OBJECTIVE BOX
CC/F/U for: HTN crisis, cardiac ischemia, CKD    HPI:  Patient is a 53 y/o male with pmhx of  HTN, CKD  who presents withhe was in [facility] was checking his room mates BP and then decided to check his and it was 200/110 and comes in.  pt denies any HA, fever, chills, sob, palpitations, cp, n/v/d/c.  Pt denies being compliant w/meds, ran out of norvasc a month ago (didnt ask pmd for refill) hydralazine states affects sexually so doesnt take all the time, was just taking losartan.  In ed pt also found to have elevated troponin, low K.  pt w/good exercise tolerance.  on reviewing prior records pt had hep B sAg + in 2016, pt w/o fu on that (09 Sep 2017 14:26)        INTERVAL HPI/OVERNIGHT EVENTS:  Pt seen and examined at bedside; feels well, no chest pain, no sob.     Allergies/Intolerance: No Known Allergies      MEDICATIONS  (STANDING):  heparin  Injectable 5000 Unit(s) SubCutaneous every 12 hours  amLODIPine   Tablet 10 milliGRAM(s) Oral daily  losartan 100 milliGRAM(s) Oral daily  aspirin enteric coated 81 milliGRAM(s) Oral daily  cloNIDine 0.2 milliGRAM(s) Oral every 8 hours  doxazosin 4 milliGRAM(s) Oral at bedtime    MEDICATIONS  (PRN):        ROS: as above; all other systems reviewed and wnl      PHYSICAL EXAMINATION:  Vital Signs Last 24 Hrs  T(C): 36 (11 Sep 2017 13:34), Max: 37.6 (10 Sep 2017 16:45)  T(F): 96.8 (11 Sep 2017 13:34), Max: 99.6 (10 Sep 2017 16:45)  HR: 65 (11 Sep 2017 13:34) (57 - 70)  BP: 130/90 (11 Sep 2017 13:34) (130/90 - 170/111)  RR: 16 (11 Sep 2017 13:34) (16 - 22)  SpO2: 98% (11 Sep 2017 13:34) (96% - 98%)      GENERAL: young male; NAD, well-groomed, well-developed  HEAD:  atraumatic, normocephalic  EYES: sclera anicteric  ENMT: mucous membranes moist  NECK: supple, No JVD  CHEST/LUNG: respirations unlabored; air entry symmetric; clear to auscultation bilaterally; no rales, rhonchi, or wheezing b/l  HEART: normal S1, S2  ABDOMEN: BS+, soft, ND, NT   EXTREMITIES:  pulses palpable; no clubbing, cyanosis, or edema b/l LEs  NEURO: awake, alert, interactive; moves all extremities  SKIN: no rashes or lesions      LABS:                        12.2   5.3   )-----------( 236      ( 11 Sep 2017 06:10 )             39.4     09-11    142  |  107  |  33<H>  ----------------------------<  86  4.2   |  26  |  2.03<H>    Ca    8.6      11 Sep 2017 06:10      ASSESSMENT AND PLAN:  52y Male, poorly controlled HTN, noncompliant w/meds, stage 3 CKD (b/l Cr 2); adm w/HTN crisis w//110, elev trops 0.125->0.333; s/p optimization on antiHTNves, undergoing cardiac eval for ischemia    CV:  *poorly controlled HTN - pt noncompliant; BP measures improved in house  -continue multidrug antiHTNve regimen  -encourage compliance as o/p w/meds and clinic f/u    *cardiac ischemia/elev trop  -TTE shows nl EF, HTN cardiomyopathy, and grade 1 diastolic dysf  -Cardiology/Fefer following - will d/w if pt to be stressed before d/c    RENAL: CKD - Cr at baseline; pt follows w/Gissel as o/p, but reports noncompliance w/f/u; ok for d/c from renal standpoint to continue f/u as o/p    GI: labs w/+HBVsAg - pt to f/u as outpt w/GI for further eval/mgmt    OTHER:  -dvt prophy  -mobilize oob

## 2017-09-11 NOTE — DISCHARGE NOTE ADULT - ADDITIONAL INSTRUCTIONS
Educated patient about the change in dosage of medication, importance of being compliant with medication, follow up appaointment with doctors and exercise. Teach back done.

## 2017-09-11 NOTE — PROGRESS NOTE ADULT - SUBJECTIVE AND OBJECTIVE BOX
Patient seen in follow up for CKD HTN urgency; feels ok; MAP better.    MEDICATIONS  (STANDING):  heparin  Injectable 5000 Unit(s) SubCutaneous every 12 hours  amLODIPine   Tablet 10 milliGRAM(s) Oral daily  losartan 100 milliGRAM(s) Oral daily  aspirin enteric coated 81 milliGRAM(s) Oral daily  cloNIDine 0.2 milliGRAM(s) Oral every 8 hours  doxazosin 4 milliGRAM(s) Oral at bedtime    MEDICATIONS  (PRN):    PHYSICAL EXAM:      T(C): 36 (09-11-17 @ 13:34), Max: 37.6 (09-10-17 @ 16:45)  HR: 65 (09-11-17 @ 13:34) (57 - 70)  BP: 130/90 (09-11-17 @ 13:34) (130/90 - 170/111)  RR: 16 (09-11-17 @ 13:34) (16 - 22)  SpO2: 98% (09-11-17 @ 13:34) (96% - 98%)  Wt(kg): --  Respiratory: clear anteriorly, decreased BS at bases  Cardiovascular: S1 S2  Gastrointestinal: soft NT ND +BS  Extremities:  tr edema                                    12.2   5.3   )-----------( 236      ( 11 Sep 2017 06:10 )             39.4     09-11    142  |  107  |  33<H>  ----------------------------<  86  4.2   |  26  |  2.03<H>    Ca    8.6      11 Sep 2017 06:10            Assessment and Plan:  CKD 3; HTN; improved;  Continue current protocol; no objection to d/c from renal view.

## 2017-09-11 NOTE — DISCHARGE NOTE ADULT - MEDICATION SUMMARY - MEDICATIONS TO STOP TAKING
I will STOP taking the medications listed below when I get home from the hospital:    hydrALAZINE 50 mg oral tablet  -- 1 tab(s) by mouth every 8 hours

## 2017-09-11 NOTE — DISCHARGE NOTE ADULT - CARE PROVIDER_API CALL
Galen Chauhan), Internal Medicine; Nephrology  [address] [facility]  [address] [id number]  Phone: [phone]  Fax: [phone]    Clemente Craft), Cardiology; Cardiovascular Disease; Nuclear Cardiology  [address] [place] [address]  Phone: [phone]  Fax: [phone]

## 2017-09-11 NOTE — DISCHARGE NOTE ADULT - MEDICATION SUMMARY - MEDICATIONS TO CHANGE
I will SWITCH the dose or number of times a day I take the medications listed below when I get home from the hospital:  None I will SWITCH the dose or number of times a day I take the medications listed below when I get home from the hospital:    losartan 25 mg oral tablet  -- 1 tab(s) by mouth once a day    cloNIDine 0.1 mg oral tablet  -- 1 tab(s) by mouth 3 times a day

## 2017-09-11 NOTE — DISCHARGE NOTE ADULT - PLAN OF CARE
better management of Blood pressure continue BP medications,  follow up with MD and cardiologist improved follow up with nephrology

## 2017-09-11 NOTE — PROGRESS NOTE ADULT - SUBJECTIVE AND OBJECTIVE BOX
Patient is a 52y old  Male who presents with a chief complaint of high BP (09 Sep 2017 14:26)      PAST MEDICAL & SURGICAL HISTORY:  Renal failure  Pneumonia  Hypertension  H/O vasectomy      INTERVAL HISTORY: resting in bed, not in any acute distress  	  MEDICATIONS:  MEDICATIONS  (STANDING):  heparin  Injectable 5000 Unit(s) SubCutaneous every 12 hours  amLODIPine   Tablet 10 milliGRAM(s) Oral daily  losartan 100 milliGRAM(s) Oral daily  aspirin enteric coated 81 milliGRAM(s) Oral daily  cloNIDine 0.2 milliGRAM(s) Oral every 8 hours  doxazosin 4 milliGRAM(s) Oral at bedtime    MEDICATIONS  (PRN):      Vitals:  T(F): 96.8 (09-11-17 @ 13:34), Max: 97.1 (09-10-17 @ 21:25)  HR: 65 (09-11-17 @ 13:34) (57 - 68)  BP: 130/90 (09-11-17 @ 13:34) (130/90 - 170/111)  RR: 16 (09-11-17 @ 13:34) (16 - 17)  SpO2: 98% (09-11-17 @ 13:34) (96% - 98%)  Wt(kg): --    09-10 @ 07:01  -  09-11 @ 07:00  --------------------------------------------------------  IN:    Oral Fluid: 300 mL  Total IN: 300 mL    OUT:  Total OUT: 0 mL    Total NET: 300 mL              PHYSICAL EXAM:  Neuro: Awake, responsive  CV: S1 S2 RRR  Lungs: CTABL  GI: Soft, BS +, ND, NT  Extremities: No edema    TELEMETRY:   	    ECG:  	    RADIOLOGY:     DIAGNOSTIC TESTING:    [ ] Echocardiogram:  [ ]  Catheterization:  [ ] Stress Test:    OTHER: 	2    LABS:	 	    CARDIAC MARKERS:  Troponin I, Serum: .333 ng/mL (09-09 @ 14:34)  Troponin I, Serum: .125 ng/mL (09-09 @ 03:05)          11 Sep 2017 06:10    142    |  107    |  33     ----------------------------<  86     4.2     |  26     |  2.03   10 Sep 2017 08:40    144    |  109    |  31     ----------------------------<  91     3.4     |  26     |  2.02   09 Sep 2017 14:34    144    |  110    |  32     ----------------------------<  87     3.3     |  28     |  1.92     Ca    8.6        11 Sep 2017 06:10                            12.2   5.3   )-----------( 236      ( 11 Sep 2017 06:10 )             39.4 ,                       13.1   5.9   )-----------( 250      ( 10 Sep 2017 08:40 )             42.9 ,                       13.5   9.9   )-----------( 261      ( 09 Sep 2017 03:05 )             43.4   proBNP: Serum Pro-Brain Natriuretic Peptide: 1135 pg/mL (09-09 @ 03:05)    Lipid Profile: 09-10 @ 11:41  HDL/Total Cholesterol: 4.4 RATIO  HDL Chol:              36 mg/dL  Serum Chol:            157 mg/dL  Direct LDL:            93 mg/dL  Triglycerides:         138 mg/dL    HgA1c:   TSH: Thyroid Stimulating Hormone, Serum: 0.786 uIU/mL (09-09 @ 14:34)        INR: 1.04 ratio (09-09 @ 03:05) Patient is a 52y old  Male who presents with a chief complaint of high BP (09 Sep 2017 14:26)      PAST MEDICAL & SURGICAL HISTORY:  Renal failure  Pneumonia  Hypertension  H/O vasectomy      INTERVAL HISTORY: resting in bed, not in any acute distress  	  MEDICATIONS:  MEDICATIONS  (STANDING):  heparin  Injectable 5000 Unit(s) SubCutaneous every 12 hours  amLODIPine   Tablet 10 milliGRAM(s) Oral daily  losartan 100 milliGRAM(s) Oral daily  aspirin enteric coated 81 milliGRAM(s) Oral daily  cloNIDine 0.2 milliGRAM(s) Oral every 8 hours  doxazosin 4 milliGRAM(s) Oral at bedtime    MEDICATIONS  (PRN):      Vitals:  T(F): 96.8 (09-11-17 @ 13:34), Max: 97.1 (09-10-17 @ 21:25)  HR: 65 (09-11-17 @ 13:34) (57 - 68)  BP: 130/90 (09-11-17 @ 13:34) (130/90 - 170/111)  RR: 16 (09-11-17 @ 13:34) (16 - 17)  SpO2: 98% (09-11-17 @ 13:34) (96% - 98%)  Wt(kg): --    09-10 @ 07:01  -  09-11 @ 07:00  --------------------------------------------------------  IN:    Oral Fluid: 300 mL  Total IN: 300 mL    OUT:  Total OUT: 0 mL    Total NET: 300 mL      PHYSICAL EXAM:  Neuro: Awake, responsive  CV: S1 S2 RRR  Lungs: CTABL  GI: Soft, BS +, ND, NT  Extremities: No edema       RADIOLOGY: < from: Xray Chest 1 View AP/PA. (01.11.16 @ 19:39) >  The lungs are clear.  No pleural abnormality is seen.  The heart is   enlarged. Mediastinal contours are within normal limits.    IMPRESSION: No evidence of active chest disease. Cardiomegaly.    < end of copied text >      DIAGNOSTIC TESTING:    [x ] Echocardiogram: < from: TTE Echo Doppler w/o Cont (09.09.17 @ 15:19) >   1. Left ventricular ejection fraction, by visual estimation, is 70 to   75%.   2. Hypertensive cardiomyopathy.   3. Spectral Doppler shows impaired relaxation pattern of left   ventricular myocardial filling (Grade I diastolic dysfunction).   4. There is mild concentric left ventricular hypertrophy.   5. Mild mitral valve regurgitation.   6. Mild aortic regurgitation.    < end of copied text >      LABS:	 	    CARDIAC MARKERS:  Troponin I, Serum: .333 ng/mL (09-09 @ 14:34)  Troponin I, Serum: .125 ng/mL (09-09 @ 03:05)      11 Sep 2017 06:10    142    |  107    |  33     ----------------------------<  86     4.2     |  26     |  2.03   10 Sep 2017 08:40    144    |  109    |  31     ----------------------------<  91     3.4     |  26     |  2.02   09 Sep 2017 14:34    144    |  110    |  32     ----------------------------<  87     3.3     |  28     |  1.92     Ca    8.6        11 Sep 2017 06:10                            12.2   5.3   )-----------( 236      ( 11 Sep 2017 06:10 )             39.4 ,                       13.1   5.9   )-----------( 250      ( 10 Sep 2017 08:40 )             42.9 ,                       13.5   9.9   )-----------( 261      ( 09 Sep 2017 03:05 )             43.4   proBNP: Serum Pro-Brain Natriuretic Peptide: 1135 pg/mL (09-09 @ 03:05)    Lipid Profile: 09-10 @ 11:41  HDL/Total Cholesterol: 4.4 RATIO  HDL Chol:              36 mg/dL  Serum Chol:            157 mg/dL  Direct LDL:            93 mg/dL  Triglycerides:         138 mg/dL    HgA1c:   TSH: Thyroid Stimulating Hormone, Serum: 0.786 uIU/mL (09-09 @ 14:34)        INR: 1.04 ratio (09-09 @ 03:05) Patient is a 52y old  Male who presents with a chief complaint of high BP (09 Sep 2017 14:26)      PAST MEDICAL & SURGICAL HISTORY:  Renal failure  Pneumonia  Hypertension  H/O vasectomy      INTERVAL HISTORY: resting in bed, not in any acute distress, denies any chest pain or sob  	  MEDICATIONS:  MEDICATIONS  (STANDING):  heparin  Injectable 5000 Unit(s) SubCutaneous every 12 hours  amLODIPine   Tablet 10 milliGRAM(s) Oral daily  losartan 100 milliGRAM(s) Oral daily  aspirin enteric coated 81 milliGRAM(s) Oral daily  cloNIDine 0.2 milliGRAM(s) Oral every 8 hours  doxazosin 4 milliGRAM(s) Oral at bedtime    MEDICATIONS  (PRN):      Vitals:  T(F): 96.8 (09-11-17 @ 13:34), Max: 97.1 (09-10-17 @ 21:25)  HR: 65 (09-11-17 @ 13:34) (57 - 68)  BP: 130/90 (09-11-17 @ 13:34) (130/90 - 170/111)  RR: 16 (09-11-17 @ 13:34) (16 - 17)  SpO2: 98% (09-11-17 @ 13:34) (96% - 98%)  Wt(kg): --    09-10 @ 07:01  -  09-11 @ 07:00  --------------------------------------------------------  IN:    Oral Fluid: 300 mL  Total IN: 300 mL    OUT:  Total OUT: 0 mL    Total NET: 300 mL      PHYSICAL EXAM:  Neuro: Awake, responsive  CV: S1 S2 RRR, + soft systolic murmur  Lungs: CTABL  GI: Soft, BS +, ND, NT  Extremities: No edema       RADIOLOGY: < from: Xray Chest 1 View AP/PA. (01.11.16 @ 19:39) >  The lungs are clear.  No pleural abnormality is seen.  The heart is   enlarged. Mediastinal contours are within normal limits.    IMPRESSION: No evidence of active chest disease. Cardiomegaly.    < end of copied text >      DIAGNOSTIC TESTING:    [x ] Echocardiogram: < from: TTE Echo Doppler w/o Cont (09.09.17 @ 15:19) >   1. Left ventricular ejection fraction, by visual estimation, is 70 to   75%.   2. Hypertensive cardiomyopathy.   3. Spectral Doppler shows impaired relaxation pattern of left   ventricular myocardial filling (Grade I diastolic dysfunction).   4. There is mild concentric left ventricular hypertrophy.   5. Mild mitral valve regurgitation.   6. Mild aortic regurgitation.    < end of copied text >      LABS:	 	    CARDIAC MARKERS:  Troponin I, Serum: .333 ng/mL (09-09 @ 14:34)  Troponin I, Serum: .125 ng/mL (09-09 @ 03:05)      11 Sep 2017 06:10    142    |  107    |  33     ----------------------------<  86     4.2     |  26     |  2.03   10 Sep 2017 08:40    144    |  109    |  31     ----------------------------<  91     3.4     |  26     |  2.02   09 Sep 2017 14:34    144    |  110    |  32     ----------------------------<  87     3.3     |  28     |  1.92     Ca    8.6        11 Sep 2017 06:10                            12.2   5.3   )-----------( 236      ( 11 Sep 2017 06:10 )             39.4 ,                       13.1   5.9   )-----------( 250      ( 10 Sep 2017 08:40 )             42.9 ,                       13.5   9.9   )-----------( 261      ( 09 Sep 2017 03:05 )             43.4   proBNP: Serum Pro-Brain Natriuretic Peptide: 1135 pg/mL (09-09 @ 03:05)    Lipid Profile: 09-10 @ 11:41  HDL/Total Cholesterol: 4.4 RATIO  HDL Chol:              36 mg/dL  Serum Chol:            157 mg/dL  Direct LDL:            93 mg/dL  Triglycerides:         138 mg/dL    HgA1c:   TSH: Thyroid Stimulating Hormone, Serum: 0.786 uIU/mL (09-09 @ 14:34)        INR: 1.04 ratio (09-09 @ 03:05)

## 2017-09-11 NOTE — DISCHARGE NOTE ADULT - MEDICATION SUMMARY - MEDICATIONS TO TAKE
I will START or STAY ON the medications listed below when I get home from the hospital:    aspirin 81 mg oral tablet, chewable  -- 1 tab(s) by mouth once a day  -- Indication: For Cardiac ischemia    losartan 25 mg oral tablet  -- 1 tab(s) by mouth once a day  -- Indication: For Hypertension    cloNIDine 0.1 mg oral tablet  -- 1 tab(s) by mouth 3 times a day  -- Indication: For Hypertension    doxazosin 4 mg oral tablet  -- 1 tab(s) by mouth once a day (at bedtime)  -- Indication: For Hypertension    amLODIPine 10 mg oral tablet  -- 1 tab(s) by mouth once a day  -- Indication: For  hypertension I will START or STAY ON the medications listed below when I get home from the hospital:    aspirin 81 mg oral tablet, chewable  -- 1 tab(s) by mouth once a day  -- Indication: For Cardiac ischemia    losartan 25 mg oral tablet  -- 1 tab(s) by mouth once a day  -- Indication: For Hypertension    cloNIDine 0.1 mg oral tablet  -- 1 tab(s) by mouth 3 times a day  -- Indication: For Hypertension    doxazosin 4 mg oral tablet  -- 1 tab(s) by mouth once a day (at bedtime)  -- Indication: For Hypertensive crisis    amLODIPine 10 mg oral tablet  -- 1 tab(s) by mouth once a day  -- Indication: For  hypertension I will START or STAY ON the medications listed below when I get home from the hospital:    aspirin 81 mg oral tablet, chewable  -- 1 tab(s) by mouth once a day  -- Indication: For Cardiac ischemia    losartan 100 mg oral tablet  -- 1 tab(s) by mouth once a day  -- Indication: For Essential hypertension    Catapres 0.2 mg oral tablet  -- 1 tab(s) by mouth every 8 hours  -- Indication: For Essential hypertension    doxazosin 4 mg oral tablet  -- 1 tab(s) by mouth once a day (at bedtime)  -- Indication: For Hypertensive crisis    amLODIPine 10 mg oral tablet  -- 1 tab(s) by mouth once a day  -- Indication: For  hypertension

## 2017-09-11 NOTE — DISCHARGE NOTE ADULT - CARE PLAN
Principal Discharge DX:	Hypertensive crisis  Goal:	better management of Blood pressure  Instructions for follow-up, activity and diet:	continue BP medications,  follow up with MD and cardiologist  Secondary Diagnosis:	Renal failure  Instructions for follow-up, activity and diet:	improved follow up with nephrology

## 2017-09-11 NOTE — PROGRESS NOTE ADULT - ASSESSMENT
52 AA male with known HHD, CKD,  and poorly controlled BP in the setting of non compliance.  BP improving  c/w clonidine, Cardura losartan and Norvasc 52 AA male with known HHD, CKD,  and poorly controlled BP in the setting of non compliance.  BP improved  Explained the importance of medication adherence and salt restrictions  stress test as out patient  c/w clonidine, Cardura losartan and Norvasc  case d/w Dr. Craft  cardiac status stable for dc

## 2017-09-13 DIAGNOSIS — I16.0 HYPERTENSIVE URGENCY: ICD-10-CM

## 2017-09-13 DIAGNOSIS — Z79.82 LONG TERM (CURRENT) USE OF ASPIRIN: ICD-10-CM

## 2017-09-13 DIAGNOSIS — E87.6 HYPOKALEMIA: ICD-10-CM

## 2017-09-13 DIAGNOSIS — Z91.14 PATIENT'S OTHER NONCOMPLIANCE WITH MEDICATION REGIMEN: ICD-10-CM

## 2017-09-13 DIAGNOSIS — I13.10 HYPERTENSIVE HEART AND CHRONIC KIDNEY DISEASE WITHOUT HEART FAILURE, WITH STAGE 1 THROUGH STAGE 4 CHRONIC KIDNEY DISEASE, OR UNSPECIFIED CHRONIC KIDNEY DISEASE: ICD-10-CM

## 2017-09-13 DIAGNOSIS — Z87.01 PERSONAL HISTORY OF PNEUMONIA (RECURRENT): ICD-10-CM

## 2017-09-13 DIAGNOSIS — N18.3 CHRONIC KIDNEY DISEASE, STAGE 3 (MODERATE): ICD-10-CM

## 2017-09-13 DIAGNOSIS — E87.3 ALKALOSIS: ICD-10-CM

## 2017-09-13 DIAGNOSIS — Z98.52 VASECTOMY STATUS: ICD-10-CM

## 2017-09-13 DIAGNOSIS — I16.9 HYPERTENSIVE CRISIS, UNSPECIFIED: ICD-10-CM

## 2017-09-13 DIAGNOSIS — I25.9 CHRONIC ISCHEMIC HEART DISEASE, UNSPECIFIED: ICD-10-CM

## 2017-09-13 DIAGNOSIS — R74.8 ABNORMAL LEVELS OF OTHER SERUM ENZYMES: ICD-10-CM

## 2017-09-13 LAB
METANEPHRINE, PL: 19 PG/ML — SIGNIFICANT CHANGE UP (ref 0–62)
NORMETANEPHRINE, PL: 180 PG/ML — HIGH (ref 0–145)

## 2017-09-15 LAB — RENIN PLAS-CCNC: 1.49 NG/ML/HR — SIGNIFICANT CHANGE UP (ref 0.17–5.38)

## 2019-02-21 ENCOUNTER — EMERGENCY (EMERGENCY)
Facility: HOSPITAL | Age: 54
LOS: 0 days | Discharge: HOME | End: 2019-02-21
Attending: EMERGENCY MEDICINE
Payer: COMMERCIAL

## 2019-02-21 VITALS
DIASTOLIC BLOOD PRESSURE: 106 MMHG | HEART RATE: 87 BPM | OXYGEN SATURATION: 97 % | WEIGHT: 218.26 LBS | TEMPERATURE: 98 F | SYSTOLIC BLOOD PRESSURE: 174 MMHG | HEIGHT: 74 IN | RESPIRATION RATE: 19 BRPM

## 2019-02-21 VITALS
RESPIRATION RATE: 16 BRPM | DIASTOLIC BLOOD PRESSURE: 96 MMHG | SYSTOLIC BLOOD PRESSURE: 165 MMHG | HEART RATE: 80 BPM | OXYGEN SATURATION: 98 %

## 2019-02-21 DIAGNOSIS — N19 UNSPECIFIED KIDNEY FAILURE: ICD-10-CM

## 2019-02-21 DIAGNOSIS — M10.9 GOUT, UNSPECIFIED: ICD-10-CM

## 2019-02-21 DIAGNOSIS — Z98.52 VASECTOMY STATUS: Chronic | ICD-10-CM

## 2019-02-21 DIAGNOSIS — Z87.01 PERSONAL HISTORY OF PNEUMONIA (RECURRENT): ICD-10-CM

## 2019-02-21 DIAGNOSIS — I10 ESSENTIAL (PRIMARY) HYPERTENSION: ICD-10-CM

## 2019-02-21 DIAGNOSIS — M79.672 PAIN IN LEFT FOOT: ICD-10-CM

## 2019-02-21 PROCEDURE — 99283 EMERGENCY DEPT VISIT LOW MDM: CPT

## 2019-02-21 RX ORDER — HYDROCHLOROTHIAZIDE 25 MG
0 TABLET ORAL
Qty: 0 | Refills: 0 | COMMUNITY

## 2019-02-21 RX ORDER — INDOMETHACIN 50 MG
1 CAPSULE ORAL
Qty: 20 | Refills: 0 | OUTPATIENT
Start: 2019-02-21 | End: 2019-02-24

## 2019-02-21 RX ORDER — COLCHICINE 0.6 MG
1.2 TABLET ORAL ONCE
Qty: 0 | Refills: 0 | Status: COMPLETED | OUTPATIENT
Start: 2019-02-21 | End: 2019-02-21

## 2019-02-21 RX ORDER — INDOMETHACIN 50 MG
50 CAPSULE ORAL ONCE
Qty: 0 | Refills: 0 | Status: COMPLETED | OUTPATIENT
Start: 2019-02-21 | End: 2019-02-21

## 2019-02-21 RX ORDER — COLCHICINE 0.6 MG
1 TABLET ORAL
Qty: 10 | Refills: 0 | OUTPATIENT
Start: 2019-02-21 | End: 2019-03-02

## 2019-02-21 RX ADMIN — Medication 1.2 MILLIGRAM(S): at 12:48

## 2019-02-21 RX ADMIN — Medication 50 MILLIGRAM(S): at 12:49

## 2019-02-21 RX ADMIN — Medication 20 MILLIGRAM(S): at 12:49

## 2019-02-21 NOTE — ED ADULT NURSE NOTE - OBJECTIVE STATEMENT
received ft c/o l foot pain with swelling, warm to touch since last night denies recent injury denies previous hx of gout

## 2019-02-21 NOTE — ED PROVIDER NOTE - CLINICAL SUMMARY MEDICAL DECISION MAKING FREE TEXT BOX
analgesics, rest; patient made aware of elevated blood pressure reading today, possibly due to pain; patient aware to follow up with PMD for reassessment

## 2019-02-21 NOTE — ED PROVIDER NOTE - CARE PLAN
Principal Discharge DX:	Acute gout involving toe of left foot, unspecified cause  Secondary Diagnosis:	Elevated blood pressure reading with diagnosis of hypertension

## 2019-02-21 NOTE — ED ADULT TRIAGE NOTE - CHIEF COMPLAINT QUOTE
patient c/o of L foot pain and swelling started last night , denied chest pain denied difficulty breathing , denied injury

## 2019-02-21 NOTE — ED ADULT NURSE NOTE - NSIMPLEMENTINTERV_GEN_ALL_ED
Implemented All Universal Safety Interventions:  [place] to call system. Call bell, personal items and telephone within reach. Instruct patient to call for assistance. Room bathroom lighting operational. Non-slip footwear when patient is off stretcher. Physically safe environment: no spills, clutter or unnecessary equipment. Stretcher in lowest position, wheels locked, appropriate side rails in place.

## 2024-10-30 NOTE — ED ADULT NURSE NOTE - LOCATION
Head MRI showed ischemia involving brainstem.  Patient had decreased responsiveness on presentation.  Mental status with some improvement but deteriorated over last 24 hours.  Ongoing discussion with family regarding level of care noted.  Neurology follow-up.   foot

## 2025-02-07 ENCOUNTER — APPOINTMENT (OUTPATIENT)
Dept: OTOLARYNGOLOGY | Facility: CLINIC | Age: 60
End: 2025-02-07